# Patient Record
Sex: MALE | Race: WHITE
[De-identification: names, ages, dates, MRNs, and addresses within clinical notes are randomized per-mention and may not be internally consistent; named-entity substitution may affect disease eponyms.]

---

## 2018-08-09 ENCOUNTER — HOSPITAL ENCOUNTER (EMERGENCY)
Dept: HOSPITAL 97 - ER | Age: 33
Discharge: ANOTHER HOSPITAL | End: 2018-08-09
Payer: COMMERCIAL

## 2018-08-09 DIAGNOSIS — M79.645: ICD-10-CM

## 2018-08-09 DIAGNOSIS — T63.011A: Primary | ICD-10-CM
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
## 2018-08-09 LAB
BUN BLD-MCNC: 15 MG/DL (ref 7–18)
GLUCOSE SERPLBLD-MCNC: 83 MG/DL (ref 74–106)
HCT VFR BLD CALC: 45.9 % (ref 39.6–49)
INR BLD: 1.11
LYMPHOCYTES # SPEC AUTO: 3.2 K/UL (ref 0.7–4.9)
MCH RBC QN AUTO: 31.9 PG (ref 27–35)
MCV RBC: 92.4 FL (ref 80–100)
PMV BLD: 9.8 FL (ref 7.6–11.3)
POTASSIUM SERPL-SCNC: 3.5 MMOL/L (ref 3.5–5.1)
RBC # BLD: 4.97 M/UL (ref 4.33–5.43)

## 2018-08-09 PROCEDURE — 96374 THER/PROPH/DIAG INJ IV PUSH: CPT

## 2018-08-09 PROCEDURE — 80048 BASIC METABOLIC PNL TOTAL CA: CPT

## 2018-08-09 PROCEDURE — 99285 EMERGENCY DEPT VISIT HI MDM: CPT

## 2018-08-09 PROCEDURE — 85025 COMPLETE CBC W/AUTO DIFF WBC: CPT

## 2018-08-09 PROCEDURE — 85610 PROTHROMBIN TIME: CPT

## 2018-08-09 PROCEDURE — 80320 DRUG SCREEN QUANTALCOHOLS: CPT

## 2018-08-09 PROCEDURE — 90714 TD VACC NO PRESV 7 YRS+ IM: CPT

## 2018-08-09 PROCEDURE — 96361 HYDRATE IV INFUSION ADD-ON: CPT

## 2018-08-09 PROCEDURE — 36415 COLL VENOUS BLD VENIPUNCTURE: CPT

## 2018-08-09 PROCEDURE — 96375 TX/PRO/DX INJ NEW DRUG ADDON: CPT

## 2018-08-09 PROCEDURE — 85384 FIBRINOGEN ACTIVITY: CPT

## 2018-08-09 NOTE — ER
Nurse's Notes                                                                                     

 [facility]                                                                

Name: Joaquín Gasca                                                                               

Age: 33 yrs                                                                                       

Sex: Male                                                                                         

: 1985                                                                                   

MRN: [id number]                                                                                   

Arrival Date: 2018                                                                          

Time: 20:12                                                                                       

Account#: [id number]                                                                            

Bed 2                                                                                             

Private MD: Randolph Urban                                                                         

Diagnosis: Snake bite ( Rattle snake ) left middle finger                                         

                                                                                                  

Presentation:                                                                                     

                                                                                             

20:17 Presenting complaint: Patient states: Reports rattlesnake bite to left 3rd distal       aj  

      digit. Patient reports that he "sucked the venom out" and applied a tourniquet to left      

      wrist. Transition of care: patient was not received from another setting of care. Onset     

      of symptoms was 2018. Risk Assessment: Do you want to hurt yourself or           

      someone else? Patient reports no desire to harm self or others. Initial Sepsis Screen:      

      Does the patient meet any 2 criteria? No. Patient's initial sepsis screen is negative.      

      Does the patient have a suspected source of infection? No. Patient's initial sepsis         

      screen is negative. Care prior to arrival: None.                                            

20:17 Method Of Arrival: Ambulatory                                                             

20:17 Acuity: BRUNILDA 3                                                                             

                                                                                                  

Triage Assessment:                                                                                

20:19 Bite description: bite sustained to palmar aspect of distal phalanx of left middle      aj  

      finger by a snake, animal information: vaccination(s) is not applicable. General:           

      Appears in no apparent distress. comfortable, Behavior is calm, cooperative,                

      appropriate for age. Pain: Complains of pain in palmar aspect of distal phalanx of left     

      middle finger. Neuro: Level of Consciousness is awake, alert, obeys commands, Oriented      

      to person, place, time, situation, Appropriate for age. Respiratory: Airway is patent       

      Respiratory effort is even, unlabored, Respiratory pattern is regular, symmetrical.         

      Derm: Skin is intact, is healthy with good turgor, Skin is pink, warm \T\ dry. normal.      

      Injury Description:. Injury Description: Bite sustained to palmar aspect of distal          

      phalanx of left middle finger caused by a snake, is superficial, was sustained less         

      than 30 minutes ago.                                                                        

                                                                                                  

Historical:                                                                                       

- Allergies:                                                                                      

20:19 No Known Allergies;                                                                     aj  

- Home Meds:                                                                                      

20:19 Ativan Oral [Active]; Dramamine oral oral [Active];                                     aj  

- PMHx:                                                                                           

20:19 Anxiety;                                                                                aj  

- PSHx:                                                                                           

20:19 None;                                                                                   aj  

                                                                                                  

- Immunization history:: Adult Immunizations up to date.                                          

- Social history:: Smoking status: Patient/guardian denies using tobacco, Patient uses            

  alcohol.                                                                                        

- Ebola Screening: : Patient negative for fever greater than or equal to 101.5 degrees            

  Fahrenheit, and additional compatible Ebola Virus Disease symptoms Patient denies               

  exposure to infectious person Patient denies travel to an Ebola-affected area in the            

  21 days before illness onset No symptoms or risks identified at this time.                      

                                                                                                  

                                                                                                  

Screenin:49 Abuse screen: Denies threats or abuse. Denies injuries from another. Nutritional        bp  

      screening: No deficits noted. Tuberculosis screening: No symptoms or risk factors           

      identified. Fall Risk None identified.                                                      

                                                                                                  

Assessment:                                                                                       

20:20 General: Appears in no apparent distress. comfortable, slender, Behavior is             bp  

      cooperative, appropriate for age, anxious. Pain: Complains of pain in palmar aspect of      

      distal phalanx of left middle finger. Neuro: Level of Consciousness is awake, alert,        

      obeys commands, Oriented to person, place, time, situation, Appropriate for age.            

      Cardiovascular: No deficits noted. Respiratory: Airway is patent Respiratory effort is      

      even, unlabored, Respiratory pattern is regular, symmetrical. GI: No signs and/or           

      symptoms were reported involving the gastrointestinal system. : No signs and/or           

      symptoms were reported regarding the genitourinary system. EENT: No deficits noted.         

      Derm: No deficits noted. Wound noted palmar aspect of distal phalanx of left middle         

      finger Wound is PUNCTURE WOUNDS TO LEFT 3RD DISTAL PHALANGE. Musculoskeletal:               

      Circulation, motion, and sensation intact. Range of motion: intact in all extremities.      

      Injury Description: Bite caused by a snake, is full thickness.                              

21:30 Reassessment: INFLAMMATION NOTED TO BE ADVANCING. MD NOTIFIED. TRANSFER INITIATED.      bp  

21:58 Reassessment: REPORT TO [facility] FOR HIGHER LEVEL OF CARE. TRANSPORT PENDING.         bp  

22:22 Reassessment:  EMS AT B/S FOR TRANSPORT. PT FABIAN.                                      bp  

                                                                                                  

Vital Signs:                                                                                      

20:19  / 112; Pulse 123; Resp 19; Temp 98.9; Pulse Ox 99% on R/A; Weight 65.77 kg;      aj  

      Height 5 ft. 6 in. (167.64 cm);                                                             

20:50  / 99; Pulse 115; Resp 14; Pulse Ox 96% ;                                         bp  

21:47  / 109; Pulse 133; Resp 18; Pulse Ox 95% ;                                        bp  

20:19 Body Mass Index 23.40 (65.77 kg, 167.64 cm)                                               

                                                                                                  

ED Course:                                                                                        

20:12 Patient arrived in ED.                                                                  es  

20:12 Randolph Urban MD is Private Physician.                                                 es  

20:15 Franck Stokes MD is Attending Physician.                                                    pkl 

20:17 Inserted saline lock: 20 gauge in right forearm, using aseptic technique. Blood         jb5 

      collected.                                                                                  

20:19 Triage completed.                                                                       aj  

20:19 Arm band placed on left wrist. Patient placed in an exam room.                          aj  

20:25 Randolph Bocanegra, RN is Primary Nurse.                                                    bp  

20:31 CBC with Diff Sent.                                                                     jb5 

20:31 Chem 7 Sent.                                                                            jb5 

20:31 PT-INR Sent.                                                                            jb5 

20:31 Fibrinogen Sent.                                                                        jb5 

20:49 Patient has correct armband on for positive identification. Bed in low position. Call   bp  

      light in reach. Side rails up X2. Adult w/ patient.                                         

21:03 X-ray completed. Portable x-ray completed in exam room. Patient tolerated procedure     jr1 

      well.                                                                                       

21:04 Hand Left 3 View XRAY In Process Unspecified.                                           EDMS

21:58 No provider procedures requiring assistance completed. Patient transferred, IV remains  bp  

      in place.                                                                                   

                                                                                                  

Administered Medications:                                                                         

20:36 Drug: NS 0.9% 1000 ml Route: IV; Rate: 125 ml/hr; Site: right antecubital;              bp  

22:23 Follow up: IV Status: Infusion continued upon transfer                                  bp  

20:36 Drug: morphine 2 mg Route: IVP; Site: right antecubital;                                bp  

20:37 Follow up: Response: Pain is decreased                                                  bp  

20:36 Drug: Zofran 4 mg Route: IVP; Site: right antecubital;                                  bp  

20:37 Follow up: Response: Nausea is decreased                                                bp  

20:36 Drug: Tetanus-Diphtheria Toxoid Adult 0.5 ml {: [employer]. Exp:         bp  

      2020. Lot #: [id number]. } Route: IM; Site: left deltoid;                                  

20:37 Follow up: Response: No adverse reaction                                                bp  

21:29 Drug: morphine 2 mg Route: IVP; Site: right antecubital;                                bp  

21:30 Follow up: Response: Pain is decreased                                                  bp  

21:29 Drug: NS 0.9% 500 ml Route: IV; Rate: bolus; Site: right antecubital;                   bp  

22:23 Follow up: IV Status: Completed infusion; IV Intake: 500ml                              bp  

                                                                                                  

                                                                                                  

Intake:                                                                                           

22:23 IV: 500ml; Total: 500ml.                                                                bp  

                                                                                                  

Outcome:                                                                                          

21:43 ER care complete, transfer ordered by MD.                                               pkl 

21:57 Transferred by ground EMS to [facility], Transfer form completed.             bp  

21:57 Condition: stable                                                                           

21:57 Instructed on the need for transfer.                                                        

22:24 Patient left the ED.                                                                    bp  

                                                                                                  

Signatures:                                                                                       

Dispatcher [employer]                           Linnea Bailey, RN                       RN   Franck Stephenson MD MD pkl Salyer, Edna es Ringgold, Jennifer                           jr1                                                  

Alma Smith                          jb5                                                  

Leonor Soto RN RN ea Peltier, Brian, RN                      RN   bp                                                   

                                                                                                  

Corrections: (The following items were deleted from the chart)                                    

21:58 21:38 Reassessment: Report called to receiving nurse at [facility]         bp  

      Facility. Reports they will arrange transportation. ea                                      

                                                                                                  

**************************************************************************************************

## 2018-08-09 NOTE — RAD REPORT
EXAM DESCRIPTION:  RAD -Hand Left 3 View - 8/9/2018 9:04 pm

 

CLINICAL HISTORY:

Left hand pain status post injury

 

FINDINGS:  No fracture or dislocation is seen.

 

A radiopaque foreign body is not seen

## 2018-08-09 NOTE — EDPHYS
Physician Documentation                                                                           

 [facility]                                                                

Name: Joaquín Gasca                                                                               

Age: 33 yrs                                                                                       

Sex: Male                                                                                         

: 1985                                                                                   

MRN: [id number]                                                                                   

Arrival Date: 2018                                                                          

Time: 20:12                                                                                       

Account#: [id number]                                                                            

Bed 2                                                                                             

Private MD: Randolph Urban                                                                         

ED Physician Franck Stokes                                                                             

HPI:                                                                                              

                                                                                             

20:21 This 33 yrs old  Male presents to ER via Ambulatory with complaints of Snake   pkl 

      bite.                                                                                       

20:27 The patient was bitten on the distal left middle finger. Onset: The symptoms/episode    pkl 

      began/occurred just prior to arrival. Associated signs and symptoms: Pertinent              

      positives: pain at site, Pertinent negatives: nausea and vomiting. Patient said he was      

      bitten by a rattle snake..                                                                  

                                                                                                  

Historical:                                                                                       

- Allergies:                                                                                      

20:19 No Known Allergies;                                                                     aj  

- Home Meds:                                                                                      

20:19 Ativan Oral [Active]; Dramamine oral oral [Active];                                     aj  

- PMHx:                                                                                           

20:19 Anxiety;                                                                                aj  

- PSHx:                                                                                           

20:19 None;                                                                                   aj  

                                                                                                  

- Immunization history:: Adult Immunizations up to date.                                          

- Social history:: Smoking status: Patient/guardian denies using tobacco, Patient uses            

  alcohol.                                                                                        

- Ebola Screening: : Patient negative for fever greater than or equal to 101.5 degrees            

  Fahrenheit, and additional compatible Ebola Virus Disease symptoms Patient denies               

  exposure to infectious person Patient denies travel to an Ebola-affected area in the            

  21 days before illness onset No symptoms or risks identified at this time.                      

                                                                                                  

                                                                                                  

ROS:                                                                                              

20:27 Eyes: Negative for injury, pain, redness, and discharge, ENT: Negative for injury,      pkl 

      pain, and discharge, Neck: Negative for injury, pain, and swelling, Cardiovascular:         

      Negative for chest pain, palpitations, and edema, Respiratory: Negative for shortness       

      of breath, cough, wheezing, and pleuritic chest pain, Abdomen/GI: Negative for              

      abdominal pain, nausea, vomiting, diarrhea, and constipation, Back: Negative for injury     

      and pain, : Negative for injury, bleeding, discharge, and swelling.                       

20:27 MS/extremity: Positive for bite, ecchymosis, pain, of the distal  left  middle  finger,     

      single  puncture  wound  noted.                                                             

20:27 Neuro: Negative for altered mental status, loss of consciousness.                           

                                                                                                  

Exam:                                                                                             

20:27 Head/Face:  Normocephalic, atraumatic. Eyes:  Pupils equal round and reactive to light, pkl 

      extra-ocular motions intact.  Lids and lashes normal.  Conjunctiva and sclera are           

      non-icteric and not injected.  Cornea within normal limits.  Periorbital areas with no      

      swelling, redness, or edema. ENT:  Nares patent. No nasal discharge, no septal              

      abnormalities noted.  Tympanic membranes are normal and external auditory canals are        

      clear.  Oropharynx with no redness, swelling, or masses, exudates, or evidence of           

      obstruction, uvula midline.  Mucous membranes moist. Neck:  Trachea midline, no             

      thyromegaly or masses palpated, and no cervical lymphadenopathy.  Supple, full range of     

      motion without nuchal rigidity, or vertebral point tenderness.  No Meningismus.             

      Chest/axilla:  Normal chest wall appearance and motion.  Nontender with no deformity.       

      No lesions are appreciated. Cardiovascular:  Regular rate and rhythm with a normal S1       

      and S2.  No gallops, murmurs, or rubs.  Normal PMI, no JVD.  No pulse deficits.             

      Respiratory:  Lungs have equal breath sounds bilaterally, clear to auscultation and         

      percussion.  No rales, rhonchi or wheezes noted.  No increased work of breathing, no        

      retractions or nasal flaring. Abdomen/GI:  Soft, non-tender, with normal bowel sounds.      

      No distension or tympany.  No guarding or rebound.  No evidence of tenderness               

      throughout. Back:  No spinal tenderness.  No costovertebral tenderness.  Full range of      

      motion. Neuro:  Awake and alert, GCS 15, oriented to person, place, time, and               

      situation.  Cranial nerves II-XII grossly intact.  Motor strength 5/5 in all                

      extremities.  Sensory grossly intact.  Cerebellar exam normal.  Normal gait.                

20:27 Musculoskeletal/extremity: Extremities: grossly normal except: noted in the distal          

      left  middle  finger: ecchymosis, pain, bite, single  puncture  wound  noted.               

                                                                                                  

Vital Signs:                                                                                      

20:19  / 112; Pulse 123; Resp 19; Temp 98.9; Pulse Ox 99% on R/A; Weight 65.77 kg;      aj  

      Height 5 ft. 6 in. (167.64 cm);                                                             

20:50  / 99; Pulse 115; Resp 14; Pulse Ox 96% ;                                         bp  

21:47  / 109; Pulse 133; Resp 18; Pulse Ox 95% ;                                        bp  

20:19 Body Mass Index 23.40 (65.77 kg, 167.64 cm)                                             aj  

                                                                                                  

MDM:                                                                                              

20:15 Patient medically screened.                                                             pkl 

21:40 Data reviewed: vital signs, nurses notes, lab test result(s), radiologic studies, plain pkl 

      films. ED course: Talked to Dr. Adan ( [facility] ), accepted transfer.         

                                                                                                  

                                                                                             

20:20 Order name: CBC with Diff; Complete Time: 21:12                                         pkl 

                                                                                             

20:20 Order name: Chem 7; Complete Time: 21:02                                                pkl 

                                                                                             

20:20 Order name: PT-INR; Complete Time: 21:43                                                pkl 

                                                                                             

20:20 Order name: Fibrinogen; Complete Time: 21:43                                            pkl 

                                                                                             

20:33 Order name: ETOH Level; Complete Time: 23:30                                            pkl 

                                                                                             

20:38 Order name: Hand Left 3 View XRAY; Complete Time: 21:23                                 pkl 

                                                                                                  

Administered Medications:                                                                         

20:36 Drug: NS 0.9% 1000 ml Route: IV; Rate: 125 ml/hr; Site: right antecubital;              bp  

22:23 Follow up: IV Status: Infusion continued upon transfer                                  bp  

20:36 Drug: morphine 2 mg Route: IVP; Site: right antecubital;                                bp  

20:37 Follow up: Response: Pain is decreased                                                  bp  

20:36 Drug: Zofran 4 mg Route: IVP; Site: right antecubital;                                  bp  

20:37 Follow up: Response: Nausea is decreased                                                bp  

20:36 Drug: Tetanus-Diphtheria Toxoid Adult 0.5 ml {: [employer]. Exp:         bp  

      2020. Lot #: [id number]. } Route: IM; Site: left deltoid;                                  

20:37 Follow up: Response: No adverse reaction                                                bp  

21:29 Drug: morphine 2 mg Route: IVP; Site: right antecubital;                                bp  

21:30 Follow up: Response: Pain is decreased                                                  bp  

21:29 Drug: NS 0.9% 500 ml Route: IV; Rate: bolus; Site: right antecubital;                   bp  

22:23 Follow up: IV Status: Completed infusion; IV Intake: 500ml                              bp  

                                                                                                  

                                                                                                  

Disposition:                                                                                      

18 21:43 Transfer ordered to [facility]. Diagnosis is Snake      

  bite ( Rattle snake ) left middle finger.                                                       

- Reason for transfer: Higher level of care.                                                      

- Accepting physician is Dr. Adan.                                                              

- Condition is Stable.                                                                            

- Problem is new.                                                                                 

- Symptoms have worsened.                                                                         

                                                                                                  

                                                                                                  

                                                                                                  

Signatures:                                                                                       

Dispatcher [employer]                           Linnea Bailey, RN                       RN   Franck Stephenson MD MD   pkl                                                  

Randolph Bocanegra RN                      RN   bp                                                   

                                                                                                  

Corrections: (The following items were deleted from the chart)                                    

22:24 21:43 2018 21:43 Transfer ordered to [facility].       bp  

      Diagnosis is Snake bite ( Rattle snake ) left middle finger. Reason for transfer:           

      Higher level of care. Accepting physician is Dr. Adan. Condition is Stable. Problem       

      is new. Symptoms have worsened. pkl                                                         

                                                                                                  

**************************************************************************************************

## 2019-07-31 ENCOUNTER — HOSPITAL ENCOUNTER (EMERGENCY)
Dept: HOSPITAL 97 - ER | Age: 34
Discharge: HOME | End: 2019-07-31
Payer: COMMERCIAL

## 2019-07-31 DIAGNOSIS — R11.2: Primary | ICD-10-CM

## 2019-07-31 DIAGNOSIS — F41.9: ICD-10-CM

## 2019-07-31 DIAGNOSIS — R19.7: ICD-10-CM

## 2019-07-31 LAB
ALBUMIN SERPL BCP-MCNC: 4.7 G/DL (ref 3.4–5)
ALP SERPL-CCNC: 91 U/L (ref 45–117)
ALT SERPL W P-5'-P-CCNC: 31 U/L (ref 12–78)
AST SERPL W P-5'-P-CCNC: 19 U/L (ref 15–37)
BUN BLD-MCNC: 12 MG/DL (ref 7–18)
GLUCOSE SERPLBLD-MCNC: 98 MG/DL (ref 74–106)
HCT VFR BLD CALC: 47.3 % (ref 39.6–49)
LIPASE SERPL-CCNC: 137 U/L (ref 73–393)
LYMPHOCYTES # SPEC AUTO: 1.1 K/UL (ref 0.7–4.9)
PMV BLD: 9.5 FL (ref 7.6–11.3)
POTASSIUM SERPL-SCNC: 4.1 MMOL/L (ref 3.5–5.1)
RBC # BLD: 5.11 M/UL (ref 4.33–5.43)

## 2019-07-31 PROCEDURE — 83690 ASSAY OF LIPASE: CPT

## 2019-07-31 PROCEDURE — 80076 HEPATIC FUNCTION PANEL: CPT

## 2019-07-31 PROCEDURE — 36415 COLL VENOUS BLD VENIPUNCTURE: CPT

## 2019-07-31 PROCEDURE — 85025 COMPLETE CBC W/AUTO DIFF WBC: CPT

## 2019-07-31 PROCEDURE — 80048 BASIC METABOLIC PNL TOTAL CA: CPT

## 2019-07-31 NOTE — ER
Nurse's Notes                                                                                     

 [facility]                                                                 

Name: Joaquín Gasca                                                                               

Age: 34 yrs                                                                                       

Sex: Male                                                                                         

: 1985                                                                                   

MRN: [id number]                                                                                   

Arrival Date: 2019                                                                          

Time: 11:12                                                                                       

Account#: [id number]                                                                            

Bed 13                                                                                            

Private MD: Randolph Urban                                                                         

Diagnosis: Vomiting;Diarrhea, unspecified                                                         

                                                                                                  

Presentation:                                                                                     

                                                                                             

11:26 Presenting complaint: N/V/D, chills, and left sided abdominal pain x 2 days. Transition hb  

      of care: patient was not received from another setting of care. Onset of symptoms was       

      2019. Risk Assessment: Do you want to hurt yourself or someone else? Patient       

      reports no desire to harm self or others. Initial Sepsis Screen: Does the patient meet      

      any 2 criteria?. Care prior to arrival: None.                                               

11:26 Method Of Arrival: Ambulatory                                                           hb  

11:26 Acuity: BRUNILDA 3                                                                           hb  

                                                                                                  

Historical:                                                                                       

- Allergies:                                                                                      

11:28 No Known Allergies;                                                                     hb  

- Home Meds:                                                                                      

11:28 Ativan 1 mg oral tab twice a day [Active]; propanolol [Active];                         hb  

- PMHx:                                                                                           

11:28 Anxiety;                                                                                hb  

- PSHx:                                                                                           

11:28 None;                                                                                   hb  

                                                                                                  

- Immunization history:: Adult Immunizations up to date.                                          

- Social history:: Smoking status: Patient/guardian denies using tobacco.                         

- Ebola Screening: : No symptoms or risks identified at this time.                                

                                                                                                  

                                                                                                  

Screenin:41 Abuse screen: Denies threats or abuse. Denies injuries from another. Nutritional        aj1 

      screening: No deficits noted. Tuberculosis screening: No symptoms or risk factors           

      identified.                                                                                 

13:31 Fall Risk None identified.                                                              aj1 

                                                                                                  

Assessment:                                                                                       

11:41 General: Appears in no apparent distress. uncomfortable, Behavior is calm, cooperative, aj1 

      appropriate for age. Pain: Complains of pain in left lower quadrant Pain does not           

      radiate. Pain currently is 1 out of 10 on a pain scale. Neuro: Level of Consciousness       

      is awake, alert, obeys commands, Oriented to person, place, time, situation.                

      Cardiovascular: Patient's skin is warm and dry. Respiratory: Airway is patent               

      Respiratory effort is even, unlabored, Respiratory pattern is regular, symmetrical. GI:     

      Abdomen is flat, non-distended, Bowel sounds present X 4 quads. Abd is soft and non         

      tender X 4 quads. Reports diarrhea, nausea, vomiting. : No signs and/or symptoms were     

      reported regarding the genitourinary system. EENT: No signs and/or symptoms were            

      reported regarding the EENT system. Derm: No signs and/or symptoms reported regarding       

      the dermatologic system. Skin is pink, warm \T\ dry. normal. Musculoskeletal: No signs      

      and/or symptoms reported regarding the musculoskeletal system. Circulation, motion, and     

      sensation intact.                                                                           

13:02 Reassessment: Patient appears in no apparent distress at this time. No changes from     aj 

      previously documented assessment. Patient and/or family updated on plan of care and         

      expected duration. Pain level reassessed. Patient is alert, oriented x 3, equal             

      unlabored respirations, skin warm/dry/pink.                                                 

                                                                                                  

Vital Signs:                                                                                      

11:26  / 105; Pulse 101; Resp 18; Temp 97.9; Pulse Ox 99% on R/A; Weight 65.77 kg;      hb  

      Height 5 ft. 6 in. (167.64 cm); Pain 2/10;                                                  

13:05  / 99; Pulse 74; Resp 16; Pulse Ox 100% on R/A;                                   aj1 

11:26 Body Mass Index 23.40 (65.77 kg, 167.64 cm)                                             hb  

                                                                                                  

ED Course:                                                                                        

11:12 Patient arrived in ED.                                                                  ag5 

11:12 Randolph Urban MD is Private Physician.                                                 ag5 

11:26 Triage completed.                                                                       hb  

11:26 Arm band placed on.                                                                     hb  

11:36 Clarisa Leyva, RN is Primary Nurse.                                                   aj1 

11:40 Jamin Alexis PA is PHCP.                                                              [facility] 

11:40 Hector Faye MD is Attending Physician.                                                [facility] 

11:41 Patient has correct armband on for positive identification. Bed in low position. Call   [facility] 

      light in reach. Side rails up X 1.                                                          

11:41 No provider procedures requiring assistance completed.                                  aj1 

12:07 Initial lab(s) drawn, by me, sent to lab. Inserted saline lock: 18 gauge in right       aj1 

      antecubital area, using aseptic technique. Blood collected.                                 

13:22 Miles Bird MD is Referral Physician.                                              jm 

13:31 IV discontinued, intact, bleeding controlled, No redness/swelling at site. Pressure     aj1 

      dressing applied.                                                                           

                                                                                                  

Administered Medications:                                                                         

12:08 Drug: NS 0.9% 1000 ml Route: IV; Rate: 1 bolus; Site: right antecubital;                aj1 

13:30 Follow up: IV Status: Completed infusion; IV Intake: 1000ml                             aj1 

12:08 Drug: Zofran 4 mg Route: IVP; Site: right antecubital;                                  aj1 

13:30 Follow up: Response: No adverse reaction                                                aj1 

                                                                                                  

                                                                                                  

Intake:                                                                                           

13:30 IV: 1000ml; Total: 1000ml.                                                              aj1 

                                                                                                  

Outcome:                                                                                          

13:22 Discharge ordered by MD.                                                                marielos 

13:31 Discharged to home ambulatory, with family.                                             aj1 

13:31 Condition: good                                                                             

13:31 Discharge instructions given to patient, Instructed on discharge instructions, follow       

      up and referral plans. medication usage, Demonstrated understanding of instructions,        

      follow-up care, medications, Prescriptions given X 2.                                       

:31 Patient left the ED.                                                                    aj 

                                                                                                  

Signatures:                                                                                       

Clarisa Leyva RN                     RN   aj                                                  

Jamin Alexis PA PA jmm Baxter, Heather RN                     Cynthia Her                                ag5                                                  

                                                                                                  

**************************************************************************************************

## 2019-07-31 NOTE — EDPHYS
Physician Documentation                                                                           

 [facility]                                                                 

Name: Joaquín Gasca                                                                               

Age: 34 yrs                                                                                       

Sex: Male                                                                                         

: 1985                                                                                   

MRN: [id number]                                                                                   

Arrival Date: 2019                                                                          

Time: 11:12                                                                                       

Account#: [id number]                                                                            

Bed 13                                                                                            

Private MD: Randolph Urban                                                                         

ED Physician Hector Faye                                                                         

HPI:                                                                                              

                                                                                             

11:53 This 34 yrs old  Male presents to ER via Ambulatory with complaints of         jmm 

      Nausea/Vomiting, Fever, Headache.                                                           

11:53 The patient presents to the emergency department with nausea, vomiting, abdominal pain, jmm 

      of the left upper quadrant and left lower quadrant, described as achy. Onset: The           

      symptoms/episode began/occurred gradually, 1 day(s) ago. Possible causes: unknown. The      

      symptoms are aggravated by nothing. The symptoms are alleviated by nothing. Associated      

      signs and symptoms: Pertinent positives: diarrhea, fever, vomiting. This is 34 year old     

      male with a history of GERD, anxiety that presents to the ED with complaints of             

      abdominal pain, vomiting, diarrhea, subjective fever beginning yesterday. Denies recent     

      travel, denies recent abx use, denies infectious exposure. .                                

                                                                                                  

Historical:                                                                                       

- Allergies:                                                                                      

11:28 No Known Allergies;                                                                     hb  

- Home Meds:                                                                                      

11:28 Ativan 1 mg oral tab twice a day [Active]; propanolol [Active];                         hb  

- PMHx:                                                                                           

11:28 Anxiety;                                                                                hb  

- PSHx:                                                                                           

11:28 None;                                                                                   hb  

                                                                                                  

- Immunization history:: Adult Immunizations up to date.                                          

- Social history:: Smoking status: Patient/guardian denies using tobacco.                         

- Ebola Screening: : No symptoms or risks identified at this time.                                

                                                                                                  

                                                                                                  

ROS:                                                                                              

11:53 Eyes: Negative for injury, pain, redness, and discharge, Cardiovascular: Negative for   jmm 

      chest pain, palpitations, and edema, Respiratory: Negative for shortness of breath,         

      cough, wheezing, and pleuritic chest pain.                                                  

11:53 Back: Negative for injury and pain, MS/Extremity: Negative for injury and deformity,        

      Skin: Negative for injury, rash, and discoloration.                                         

11:53 Constitutional: Positive for body aches, chills.                                            

11:53 Abdomen/GI: Positive for abdominal pain, nausea and vomiting, diarrhea.                     

11:53 All other systems are negative.                                                             

                                                                                                  

Exam:                                                                                             

11:53 Head/Face:  atraumatic. Eyes:  EOMI, no conjunctival erythema appreciated ENT:  Moist   [facility] 

      Mucus Membranes Neck:  Trachea midline, Supple Chest/axilla:  Normal chest wall             

      appearance and motion.   Cardiovascular:  Regular rate and rhythm.  No edema                

      appreciated Respiratory:  Normal respirations, no respiratory distress appreciated          

11:53 Back:  Normal ROM Skin:  General appearance color normal MS/ Extremity:  Moves all          

      extremities, no obvious deformities appreciated, no edema noted to the lower                

      extremities  Neuro:  Awake and alert, normal gait Psych:  Behavior is normal, Mood is       

      normal, Patient is cooperative and pleasant                                                 

11:53 Constitutional: The patient appears in no acute distress, alert, awake.                     

11:53 Abdomen/GI: Inspection: abdomen appears normal, Bowel sounds: normal, Palpation:            

      abdomen is soft and non-tender, in all quadrants.                                           

                                                                                                  

Vital Signs:                                                                                      

11:26  / 105; Pulse 101; Resp 18; Temp 97.9; Pulse Ox 99% on R/A; Weight 65.77 kg;      hb  

      Height 5 ft. 6 in. (167.64 cm); Pain 2/10;                                                  

13:05  / 99; Pulse 74; Resp 16; Pulse Ox 100% on R/A;                                   aj1 

11:26 Body Mass Index 23.40 (65.77 kg, 167.64 cm)                                             hb  

                                                                                                  

MDM:                                                                                              

11:50 Patient medically screened.                                                             [facility] 

13:20 Data reviewed: vital signs, nurses notes. Counseling: I had a detailed discussion with  marielos 

      the patient and/or guardian regarding: the historical points, exam findings, and any        

      diagnostic results supporting the discharge/admit diagnosis, lab results, the need for      

      outpatient follow up, to return to the emergency department if symptoms worsen or           

      persist or if there are any questions or concerns that arise at home. ED course:            

      Patient is alert and non toxic in appearance in the ED. Patient toelrates po. Abdomen       

      is benign on reevaluation. Patient advised to follow up with dr valdivia and otherwise        

      given strict return precautions. patient understood and agrees with the plan of care. .     

                                                                                                  

                                                                                             

11:50 Order name: Basic Metabolic Panel; Complete Time: 12:41                                 [facility] 

                                                                                             

11:50 Order name: CBC with Diff; Complete Time: 12:41                                         [facility] 

                                                                                             

11:50 Order name: Creatinine for Radiology; Complete Time: 12:41                              [facility] 

                                                                                             

11:50 Order name: Hepatic Function; Complete Time: 12:41                                      [facility] 

                                                                                             

11:50 Order name: Lipase; Complete Time: 12:41                                                [facility] 

                                                                                             

11:50 Order name: IV Saline Lock; Complete Time: 12:07                                        [facility] 

                                                                                             

11:50 Order name: Labs collected and sent; Complete Time: 12:07                               [facility] 

                                                                                                  

Administered Medications:                                                                         

12:08 Drug: NS 0.9% 1000 ml Route: IV; Rate: 1 bolus; Site: right antecubital;                [facility] 

13:30 Follow up: IV Status: Completed infusion; IV Intake: 1000ml                             [facility] 

12:08 Drug: Zofran 4 mg Route: IVP; Site: right antecubital;                                  [facility] 

13:30 Follow up: Response: No adverse reaction                                                aj 

                                                                                                  

                                                                                                  

Disposition:                                                                                      

16:03 Co-signature as Attending Physician, Hector Faye MD.                                    rn  

                                                                                                  

Disposition:                                                                                      

19 13:22 Discharged to Home. Impression: Vomiting, Diarrhea, unspecified.                   

- Condition is Stable.                                                                            

- Discharge Instructions: Diarrhea, Adult, Nausea and Vomiting, Adult.                            

- Prescriptions for Zofran ODT 4 mg Oral tablet,disintegrating - place 1 tablet by                

  TRANSLINGUAL route every 4-6 hours; 20 tablet. Carafate 1 gram Oral Tablet - take 1             

  tablet by ORAL route 4 times per day take on an empty stomach, beginning on waking              

  and last dose at bedtime; 100 tablet.                                                           

- Work release form, Medication Reconciliation Form, Thank You Letter, Antibiotic                 

  Education, Prescription Opioid Use form.                                                        

- Follow up: Miles Valdivia MD; When: 2 - 3 days; Reason: Recheck today's complaints,           

  Continuance of care, Re-evaluation by your physician.                                           

                                                                                                  

                                                                                                  

                                                                                                  

Signatures:                                                                                       

Dispatcher [employer]                           Clarisa Sharif RN                     RN   aj1                                                  

Jamin Alexis PA PA   [facility]                                                  

Hector Faye MD MD rn Baxter, Heather, RN RN                                                      

                                                                                                  

Corrections: (The following items were deleted from the chart)                                    

13: 13:22 2019 13:22 Discharged to Home. Impression: Vomiting; Diarrhea, unspecified. aj1 

      Condition is Stable. Forms are Medication Reconciliation Form, Thank You Letter,            

      Antibiotic Education, Prescription Opioid Use. Follow up: Miles Valdivia; When: 2 - 3       

      days; Reason: Recheck today's complaints, Continuance of care, Re-evaluation by your        

      physician. [facility]                                                                              

                                                                                                  

**************************************************************************************************

## 2020-12-28 ENCOUNTER — HOSPITAL ENCOUNTER (EMERGENCY)
Dept: HOSPITAL 97 - ER | Age: 35
LOS: 1 days | Discharge: HOME | End: 2020-12-29
Payer: COMMERCIAL

## 2020-12-28 DIAGNOSIS — R51.9: Primary | ICD-10-CM

## 2020-12-28 DIAGNOSIS — R19.7: ICD-10-CM

## 2020-12-28 LAB
ALBUMIN SERPL BCP-MCNC: 5 G/DL (ref 3.4–5)
ALP SERPL-CCNC: 116 U/L (ref 45–117)
ALT SERPL W P-5'-P-CCNC: 46 U/L (ref 12–78)
AST SERPL W P-5'-P-CCNC: 35 U/L (ref 15–37)
BUN BLD-MCNC: 11 MG/DL (ref 7–18)
GLUCOSE SERPLBLD-MCNC: 103 MG/DL (ref 74–106)
HCT VFR BLD CALC: 47.3 % (ref 39.6–49)
LIPASE SERPL-CCNC: 186 U/L (ref 73–393)
LYMPHOCYTES # SPEC AUTO: 1 K/UL (ref 0.7–4.9)
MAGNESIUM SERPL-MCNC: 2.1 MG/DL (ref 1.8–2.4)
METHAMPHET UR QL SCN: NEGATIVE
PMV BLD: 8.6 FL (ref 7.6–11.3)
POTASSIUM SERPL-SCNC: 3.3 MMOL/L (ref 3.5–5.1)
RBC # BLD: 5.12 M/UL (ref 4.33–5.43)
THC SERPL-MCNC: NEGATIVE NG/ML
TROPONIN I: < 0.02 NG/ML (ref 0–0.04)

## 2020-12-28 PROCEDURE — 80076 HEPATIC FUNCTION PANEL: CPT

## 2020-12-28 PROCEDURE — 84484 ASSAY OF TROPONIN QUANT: CPT

## 2020-12-28 PROCEDURE — 85025 COMPLETE CBC W/AUTO DIFF WBC: CPT

## 2020-12-28 PROCEDURE — 80048 BASIC METABOLIC PNL TOTAL CA: CPT

## 2020-12-28 PROCEDURE — 36415 COLL VENOUS BLD VENIPUNCTURE: CPT

## 2020-12-28 PROCEDURE — 83690 ASSAY OF LIPASE: CPT

## 2020-12-28 PROCEDURE — 93005 ELECTROCARDIOGRAM TRACING: CPT

## 2020-12-28 PROCEDURE — 99284 EMERGENCY DEPT VISIT MOD MDM: CPT

## 2020-12-28 PROCEDURE — 83735 ASSAY OF MAGNESIUM: CPT

## 2020-12-28 PROCEDURE — 80307 DRUG TEST PRSMV CHEM ANLYZR: CPT

## 2020-12-28 PROCEDURE — 81003 URINALYSIS AUTO W/O SCOPE: CPT

## 2020-12-28 NOTE — ER
Nurse's Notes                                                                                     

 [facility]                                                                 

Name: Joaquín Gasca                                                                               

Age: 35 yrs                                                                                       

Sex: Male                                                                                         

: 1985                                                                                   

MRN: [id number]                                                                                   

Arrival Date: 2020                                                                          

Time: 18:11                                                                                       

Account#: [id number]                                                                            

Bed 27                                                                                            

Private MD:                                                                                       

Diagnosis: Nausea;Diarrhea, unspecified;Headache                                                  

                                                                                                  

Presentation:                                                                                     

                                                                                             

18:27 Chief complaint: Patient states: N/V/D with HA since Saturday. Chest tightness and      ll1 

      anxiety since Saturday, ativan didn't help. Low grade fever, runny nose, shakey, hand       

      feels cold. Coronavirus screen: Client denies travel out of the [place] in the last 14         

      days. diarrhea, fatigue, fever, nausea, shaking with chills, loss of taste or smell,        

      vomiting. Client presents with at least one sign or symptom that may indicate               

      coronavirus-19. Standard/surgical mask placed on the client. The client indicates           

      previous COVID test results are pending. Ebola Screen: Patient denies travel to an          

      Ebola-affected area in the 21 days before illness onset. Initial Sepsis Screen: Does        

      the patient meet any 2 criteria? HR > 90 bpm. No. Patient's initial sepsis screen is        

      negative. Does the patient have a suspected source of infection? Yes: Other: N/V/D.         

      Risk Assessment: Do you want to hurt yourself or someone else? Patient reports no           

      desire to harm self or others. Onset of symptoms was 2020.                     

18:27 Method Of Arrival: Ambulatory                                                           ll1 

18:27 Acuity: BRUNILDA 3                                                                           ll1 

                                                                                                  

Historical:                                                                                       

- Allergies:                                                                                      

18:34 No Known Allergies;                                                                     ll1 

- PMHx:                                                                                           

18:34 Anxiety; snakebite;                                                                     ll1 

- PSHx:                                                                                           

18:34 Appendectomy;                                                                           ll1 

                                                                                                  

- Immunization history:: Flu vaccine is not up to date.                                           

- Social history:: Smoking status: Patient denies any tobacco usage or history of.                

                                                                                                  

                                                                                                  

Screenin:20 Abuse screen: Denies threats or abuse. Denies injuries from another. Nutritional        sg  

      screening: No deficits noted. Tuberculosis screening: No symptoms or risk factors           

      identified. Never had TB. Fall Risk None identified.                                        

                                                                                                  

Assessment:                                                                                       

21:20 Reassessment: Patient appears in no apparent distress at this time. Patient and/or      sg  

      family updated on plan of care and expected duration. Pain level reassessed. Patient is     

      alert, oriented x 3, equal unlabored respirations, skin warm/dry/pink. pt requests          

      administer saline only at this time, pt states has sensitivity to medications. Reports      

      will have fluids and then provide urine sample and then other medications ordered.          

22:50 Reassessment: pt has had no vomiting/diarrhea for triage at this time, continues to     sg  

      refuse other medications ordered, pt reports feeling burning on the skin after IV NS        

      infusion.                                                                                   

22:52 Reassessment: Patient appears in no apparent distress at this time. Patient and/or      sg  

      family updated on plan of care and expected duration. Pain level reassessed. pt reports     

      feeling better after IV fluid bolus, states would like to hold off on the other             

      medications at this time.                                                                   

23:25 Reassessment: pt reports still feeling warm all over after drinking PO potassium.       sg  

23:48 Reassessment: Patient appears in no apparent distress at this time. Gustavo DAVIS at        sg  

      bedside with pt updating on results and POC for DC to home, pt stated undersatnding.        

                                                                                             

00:00 Reassessment: educated pt on discharge instructions, pt reports heart rate continues to sg  

      feel like its racing, educated pt that refused medications ordered for treatment that       

      were ordered by the PA, pt stated understanding, pt to be discharged to home.               

                                                                                                  

Vital Signs:                                                                                      

                                                                                             

18:27  / 106; Pulse 114; Resp 18; Temp 98.8; Pulse Ox 97% ; Weight 65.32 kg; Height 5   ll1 

      ft. 6 in. (167.64 cm); Pain 8/10;                                                           

20:31  / 105 RA Sitting (auto/reg); Pulse 118; Pulse Ox 100% on R/A;                    jp3 

22:31 Pulse 114; Pulse Ox 100% ;                                                              jp3 

                                                                                             

00:00  / 88; Pulse 116; Resp 18; Pulse Ox 100% on R/A;                                  sg  

                                                                                             

18:27 Body Mass Index 23.24 (65.32 kg, 167.64 cm)                                             ll1 

                                                                                                  

ED Course:                                                                                        

                                                                                             

18:11 Patient arrived in ED.                                                                  rg4 

18:33 Triage completed.                                                                       ll1 

18:34 Arm band placed on.                                                                     ll1 

18:41 EKG completed in triage. Results shown to MD.                                           ll1 

18:44 EKG done, by ED staff, reviewed by Lamin DAVIS.                                       jp3 

20:32 Bed in low position. Call light in reach. Warm blanket given. Verbal reassurance given. jp3 

      Pulse ox on. NIBP on.                                                                       

20:32 Patient maintains SpO2 saturation greater than 95% on room air.                         jp3 

20:38 Lamin Hodge PA is PHCP.                                                                cp  

20:38 Lamin Cooper MD is Attending Physician.                                             cp  

20:54 Chadd Moeller RN is Primary Nurse.                                                       sg  

21:08 Initial lab(s) drawn, by me, sent to lab. Inserted saline lock: 20 gauge in right       jp3 

      antecubital area, using aseptic technique. Blood collected.                                 

22:15 Urine collected: clean catch specimen, clear, pat colored, Legal drug screen obtained jp3 

      per protocol.                                                                               

                                                                                             

00:00 No provider procedures requiring assistance completed. IV discontinued, intact,         sg  

      bleeding controlled, No redness/swelling at site. Pressure dressing applied.                

                                                                                                  

Administered Medications:                                                                         

                                                                                             

21:20 Drug: NS 0.9% 1000 ml Route: IV; Rate: 1 bolus; Site: right antecubital;                sg  

22:45 Drug: Potassium Effervescent Tablet 50 mEq Route: PO;                                   sg  

22:47 Drug: NS 0.9% 1000 ml Route: IV; Rate: 1 bolus; Site: right antecubital;                sg  

                                                                                             

00:05 Not Given (Patient Refused): Phenergan 12.5 mg IVP once                                 sg  

00:05 Not Given (Patient Refused): Benadryl 25 mg IVP once                                    sg  

00:05 Not Given (Patient Refused): Pepcid 20 mg IVP once                                      sg  

00:05 Not Given (Patient Refused): TORadol - Ketorolac 15 mg IVP once                         sg  

                                                                                                  

                                                                                                  

Outcome:                                                                                          

                                                                                             

23:40 Discharge ordered by MD.                                                                cp  

                                                                                             

00:06 Patient left the ED.                                                                    sg  

00:15 Discharged to home via wheelchair, with family.                                         sg  

00:15 Condition: good                                                                             

00:15 Discharge instructions given to patient, Instructed on discharge instructions, follow       

      up and referral plans. medication usage, safety practices, Demonstrated understanding       

      of instructions, follow-up care, medications, Prescriptions given X 1.                      

                                                                                                  

Signatures:                                                                                       

Chadd Moeller, RN                         RN   sg                                                   

Lamin Hodge PA PA cp Garcia, Rubi rg4                                                  

Kemar Bales                              jp3                                                  

Luciano Rapp RN                       RN   ll1                                                  

                                                                                                  

Corrections: (The following items were deleted from the chart)                                    

                                                                                             

21:08 18:44 EKG done, by ED staff, jp3                                                        jp3 

                                                                                                  

**************************************************************************************************

## 2020-12-28 NOTE — EDPHYS
Physician Documentation                                                                           

 [facility]                                                                 

Name: Joaquín Gasca                                                                               

Age: 35 yrs                                                                                       

Sex: Male                                                                                         

: 1985                                                                                   

MRN: [id number]                                                                                   

Arrival Date: 2020                                                                          

Time: 18:11                                                                                       

Account#: [id number]                                                                            

Bed 27                                                                                            

Private MD:                                                                                       

ED Physician Lamin Cooper                                                                      

HPI:                                                                                              

                                                                                             

21:00 This 35 yrs old  Male presents to ER via Ambulatory with complaints of Chest   cp  

      Tightness, Migraine, Diarrhea.                                                              

21:00 The patient complains of pain to the back of head. The patient describes the headache   cp  

      as aching.                                                                                  

21:00 Onset: The symptoms/episode began/occurred 2 day(s) ago. Associated signs and symptoms: cp  

      Pertinent positives: nausea, vomiting, diarrhea, chest tightness, Pertinent negatives:      

      fever, neck stiffness, sinus congestion, sinus tenderness. Severity of symptoms: in the     

      emergency department the pain is unchanged, despite home interventions. Headache            

      History: The patient has had previous headaches and this one is similar to previous         

      episodes.                                                                                   

                                                                                                  

Historical:                                                                                       

- Allergies:                                                                                      

18:34 No Known Allergies;                                                                     ll1 

- PMHx:                                                                                           

18:34 Anxiety; snakebite;                                                                     ll1 

- PSHx:                                                                                           

18:34 Appendectomy;                                                                           ll1 

                                                                                                  

- Immunization history:: Flu vaccine is not up to date.                                           

- Social history:: Smoking status: Patient denies any tobacco usage or history of.                

                                                                                                  

                                                                                                  

ROS:                                                                                              

21:05 Constitutional: Negative for body aches, chills, fever, poor PO intake.                 cp  

21:05 Eyes: Negative for injury, pain, redness, and discharge.                                cp  

21:05 ENT: Negative for ear pain, sinus pain, sore throat, difficulty swallowing, difficulty      

      handling secretions.                                                                        

21:05 Neck: Negative for stiffness.                                                               

21:05 Cardiovascular: Positive for chest tightness.                                               

21:05 Respiratory: Negative for cough, shortness of breath, wheezing.                             

21:05 Abdomen/GI: Positive for nausea, vomiting, and diarrhea, Negative for abdominal pain.       

21:05 Skin: Negative for rash.                                                                    

21:05 Neuro: Positive for headache, Negative for altered mental status, dizziness, numbness,      

      weakness.                                                                                   

21:05 All other systems are negative.                                                             

                                                                                                  

Exam:                                                                                             

18:45 ECG was reviewed by the Attending Physician.                                            cp  

21:15 Constitutional: The patient appears in no acute distress, alert, awake,                 cp  

      non-diaphoretic, non-toxic, well developed, well nourished.                                 

21:15 Head/Face:  Normocephalic, atraumatic.                                                  cp  

21:15 Eyes: Periorbital structures: appear normal, Conjunctiva: normal, no exudate, no            

      injection, Sclera: no appreciated abnormality, Lids and lashes: appear normal,              

      bilaterally.                                                                                

21:15 ENT: External ear(s): are unremarkable, Nose: is normal, Posterior pharynx: Airway: no      

      evidence of obstruction, patent.                                                            

21:15 Neck: ROM/movement: is normal, is supple, without pain, no range of motions                 

      limitations, no nuchal rigidity.                                                            

21:15 Chest/axilla: Inspection: normal, Palpation: is normal, no crepitus, no tenderness.         

21:15 Cardiovascular: Rate: tachycardic, Rhythm: regular, Edema: is not appreciated, JVD: is      

      not appreciated.                                                                            

21:15 Respiratory: the patient does not display signs of respiratory distress,  Respirations:     

      normal, no use of accessory muscles, no retractions, labored breathing, is not present,     

      Breath sounds: are clear throughout, no decreased breath sounds, no stridor, no             

      wheezing.                                                                                   

21:15 Abdomen/GI: Inspection: abdomen appears normal, Bowel sounds: active, all quadrants,        

      Palpation: abdomen is soft and non-tender, in all quadrants.                                

21:15 Back: pain, is absent, ROM is normal.                                                       

21:15 Neuro: Orientation: to person, place \T\ time. Mentation: is normal, Motor: is normal,      

      Sensation: is normal.                                                                       

                                                                                                  

Vital Signs:                                                                                      

18:27  / 106; Pulse 114; Resp 18; Temp 98.8; Pulse Ox 97% ; Weight 65.32 kg; Height 5   ll1 

      ft. 6 in. (167.64 cm); Pain 8/10;                                                           

20:31  / 105 RA Sitting (auto/reg); Pulse 118; Pulse Ox 100% on R/A;                    jp3 

22:31 Pulse 114; Pulse Ox 100% ;                                                              jp3 

                                                                                             

00:00  / 88; Pulse 116; Resp 18; Pulse Ox 100% on R/A;                                  sg  

                                                                                             

18:27 Body Mass Index 23.24 (65.32 kg, 167.64 cm)                                             ll1 

                                                                                                  

MDM:                                                                                              

                                                                                             

20:42 Patient medically screened.                                                             cp  

21:30 Differential diagnosis: intracerebral hemorrhage, meningoencephalitis, migraine,        cp  

      subarachnoid bleed, subdural hematoma, dehydration, cardiac arrythmia.                      

23:40 Data reviewed: vital signs, nurses notes, lab test result(s), EKG, and as a result, I   cp  

      will discharge patient.                                                                     

23:40 Counseling: I had a detailed discussion with the patient and/or guardian regarding: the cp  

      historical points, exam findings, and any diagnostic results supporting the                 

      discharge/admit diagnosis, lab results, to return to the emergency department if            

      symptoms worsen or persist or if there are any questions or concerns that arise at          

      home. Response to treatment: improved. Will discharge to home for continued monitoring.     

                                                                                                  

                                                                                             

20:54 Order name: Basic Metabolic Panel; Complete Time: 22:27                                 cp  

                                                                                             

23:38 Interpretation: Normal except: K 3.3.                                                     

                                                                                             

20:54 Order name: CBC with Diff; Complete Time: 22:27                                         cp  

                                                                                             

20:54 Order name: Hepatic Function; Complete Time: 22:27                                        

                                                                                             

20:54 Order name: Lipase; Complete Time: 22:27                                                cp  

                                                                                             

20:54 Order name: Troponin I; Complete Time: 22:27                                            cp  

                                                                                             

20:54 Order name: Magnesium; Complete Time: 22:27                                             cp  

                                                                                             

20:54 Order name: UDS; Complete Time: 23:38                                                   cp  

                                                                                             

22:28 Order name: Urine Dipstick--Ancillary (enter results); Complete Time: 23:39               

                                                                                             

23:39 Interpretation: Normal except: USPGR >1.030; UKET 4+; UBLD TRACE.                         

                                                                                             

20:54 Order name: IV Saline Lock; Complete Time: 21:08                                          

                                                                                             

20:54 Order name: Labs collected and sent; Complete Time: 21:08                                 

                                                                                             

20:54 Order name: EKG; Complete Time: 20:55                                                   cp  

                                                                                             

20:54 Order name: EKG - Nurse/Tech; Complete Time: 20:56                                        

                                                                                             

20:54 Order name: Urine Dipstick-Ancillary (obtain specimen); Complete Time: 22:31            cp  

                                                                                                  

EC:45 Rate is 113 beats/min. Rhythm is regular. PA interval is normal. QRS interval is        cp  

      normal. QT interval is normal. T waves are Inverted in lead aVR. Interpreted by me.         

      Reviewed by me.                                                                             

                                                                                                  

Administered Medications:                                                                         

21:20 Drug: NS 0.9% 1000 ml Route: IV; Rate: 1 bolus; Site: right antecubital;                sg  

22:45 Drug: Potassium Effervescent Tablet 50 mEq Route: PO;                                   sg  

22:47 Drug: NS 0.9% 1000 ml Route: IV; Rate: 1 bolus; Site: right antecubital;                sg  

                                                                                             

00:05 Not Given (Patient Refused): Phenergan 12.5 mg IVP once                                 sg  

00:05 Not Given (Patient Refused): Benadryl 25 mg IVP once                                    sg  

00:05 Not Given (Patient Refused): Pepcid 20 mg IVP once                                      sg  

00:05 Not Given (Patient Refused): TORadol - Ketorolac 15 mg IVP once                         sg  

                                                                                                  

                                                                                                  

Disposition:                                                                                      

07:25 Co-signature as Attending Physician, Lamin Cooper MD I agree with the assessment and  [facility] 

      plan of care.                                                                               

                                                                                                  

Disposition:                                                                                      

20 23:40 Discharged to Home. Impression: Nausea, Diarrhea, unspecified, Headache.           

- Condition is Stable.                                                                            

- Discharge Instructions: Food Choices to Help Relieve Diarrhea, Adult, Diarrhea,                 

  Adult, Migraine Headache, Nausea, Adult.                                                        

- Prescriptions for Zofran 4 mg Oral Tablet - take 1 tablet by ORAL route every 12                

  hours As needed; 20 tablet.                                                                     

- Medication Reconciliation Form, Thank You Letter, Antibiotic Education, Prescription            

  Opioid Use form.                                                                                

- Follow up: Private Physician; When: 1 - 2 days; Reason: Recheck today's complaints.             

- Problem is new.                                                                                 

- Symptoms have improved.                                                                         

                                                                                                  

                                                                                                  

                                                                                                  

Signatures:                                                                                       

Dispatcher [employer]                           Chadd Zavala RN RN sg Anderson, Corey, MD MD cha Page, Corey, PA PA cp Lewis, Lynsay, RN                       RN   ll1                                                  

                                                                                                  

Corrections: (The following items were deleted from the chart)                                    

00:06  23:40 2020 23:40 Discharged to Home. Impression: Nausea; Diarrhea,          sg  

      unspecified; Headache. Condition is Stable. Forms are Medication Reconciliation Form,       

      Thank You Letter, Antibiotic Education, Prescription Opioid Use. Follow up: Private         

      Physician; When: 1 - 2 days; Reason: Recheck today's complaints. Problem is new.            

      Symptoms have improved. cp                                                                  

                                                                                                  

**************************************************************************************************

## 2020-12-29 VITALS — TEMPERATURE: 98.8 F

## 2020-12-29 VITALS — SYSTOLIC BLOOD PRESSURE: 148 MMHG | DIASTOLIC BLOOD PRESSURE: 105 MMHG | OXYGEN SATURATION: 100 %

## 2020-12-29 NOTE — EKG
Test Date:    2020-12-28               Test Time:    18:38:44

Technician:   NBAL                                    

                                                     

MEASUREMENT RESULTS:                                       

Intervals:                                           

Rate:         113                                    

AZ:           146                                    

QRSD:         90                                     

QT:           326                                    

QTc:          447                                    

Axis:                                                

P:            63                                     

AZ:           146                                    

QRS:          66                                     

T:            35                                     

                                                     

INTERPRETIVE STATEMENTS:                                       

                                                     

Sinus tachycardia

Otherwise normal ECG

No previous ECG available for comparison



Electronically Signed On 12-29-20 16:05:34 CST by Patrick Rai